# Patient Record
Sex: FEMALE | Race: WHITE | NOT HISPANIC OR LATINO | Employment: UNEMPLOYED | URBAN - METROPOLITAN AREA
[De-identification: names, ages, dates, MRNs, and addresses within clinical notes are randomized per-mention and may not be internally consistent; named-entity substitution may affect disease eponyms.]

---

## 2020-10-06 ENCOUNTER — PATIENT OUTREACH (OUTPATIENT)
Dept: ADMINISTRATIVE | Facility: HOSPITAL | Age: 35
End: 2020-10-06

## 2020-10-06 NOTE — LETTER
October 6, 2020    [place] [address]             [facility]  [address]  [facility] [address]  Phone: [phone] Dear Douglas Phelps    [facility] is committed to your overall health.  To help you get the most out of each of your visits, we will review your information to make sure you are up to date on all of your recommended tests and/or procedures.      As a new patient to [facility], we may not have your complete medical records. She has found that your chart shows you may be due for:    Health Maintenance Due   Topic Date Due    Hepatitis C Screening  1985    Lipid Panel  1985    HIV Screening  05/30/2000    TETANUS VACCINE  05/30/2003    Cervical Cancer Screening  05/30/2006    Influenza Vaccine (1) 08/01/2020     If you have had any of the above done at another facility, please let us know so that we may obtain copies from that facility.  If you have a copy of these records, please provide a copy for us to scan into your chart.    If you are currently taking medication, please bring it with you to your appointment for review.    Also, if you have any type of Advanced Directives, please bring them with you to your office visit so we may scan them into your chart.      Thank You,  Your [facility]  Katia Tomlinson L.P.N. Clinical Care Coordinator  [address] [place] [address], [place] [address] [phone]

## 2020-10-20 ENCOUNTER — OFFICE VISIT (OUTPATIENT)
Dept: FAMILY MEDICINE | Facility: CLINIC | Age: 35
End: 2020-10-20
Payer: MEDICAID

## 2020-10-20 VITALS
RESPIRATION RATE: 16 BRPM | OXYGEN SATURATION: 97 % | HEIGHT: 64 IN | DIASTOLIC BLOOD PRESSURE: 81 MMHG | BODY MASS INDEX: 34.02 KG/M2 | TEMPERATURE: 98 F | HEART RATE: 85 BPM | SYSTOLIC BLOOD PRESSURE: 130 MMHG | WEIGHT: 199.25 LBS

## 2020-10-20 DIAGNOSIS — Z76.89 ENCOUNTER TO ESTABLISH CARE WITH NEW DOCTOR: ICD-10-CM

## 2020-10-20 DIAGNOSIS — Z13.220 ENCOUNTER FOR LIPID SCREENING FOR CARDIOVASCULAR DISEASE: ICD-10-CM

## 2020-10-20 DIAGNOSIS — H61.21 IMPACTED CERUMEN OF RIGHT EAR: ICD-10-CM

## 2020-10-20 DIAGNOSIS — Z86.39 HISTORY OF VITAMIN D DEFICIENCY: ICD-10-CM

## 2020-10-20 DIAGNOSIS — L30.9 DERMATITIS: ICD-10-CM

## 2020-10-20 DIAGNOSIS — Z13.6 ENCOUNTER FOR LIPID SCREENING FOR CARDIOVASCULAR DISEASE: ICD-10-CM

## 2020-10-20 DIAGNOSIS — Z86.2 HISTORY OF PERNICIOUS ANEMIA: ICD-10-CM

## 2020-10-20 DIAGNOSIS — F33.2 SEVERE EPISODE OF RECURRENT MAJOR DEPRESSIVE DISORDER, WITHOUT PSYCHOTIC FEATURES: Primary | ICD-10-CM

## 2020-10-20 DIAGNOSIS — Z23 NEED FOR IMMUNIZATION AGAINST INFLUENZA: ICD-10-CM

## 2020-10-20 DIAGNOSIS — Z11.59 NEED FOR HEPATITIS C SCREENING TEST: ICD-10-CM

## 2020-10-20 DIAGNOSIS — J01.00 ACUTE NON-RECURRENT MAXILLARY SINUSITIS: ICD-10-CM

## 2020-10-20 DIAGNOSIS — Z11.4 SCREENING FOR HIV (HUMAN IMMUNODEFICIENCY VIRUS): ICD-10-CM

## 2020-10-20 PROCEDURE — 90471 IMMUNIZATION ADMIN: CPT | Mod: S$GLB,,, | Performed by: FAMILY MEDICINE

## 2020-10-20 PROCEDURE — 90686 IIV4 VACC NO PRSV 0.5 ML IM: CPT | Mod: S$GLB,,, | Performed by: FAMILY MEDICINE

## 2020-10-20 PROCEDURE — 99204 OFFICE O/P NEW MOD 45 MIN: CPT | Mod: 25,S$GLB,, | Performed by: FAMILY MEDICINE

## 2020-10-20 PROCEDURE — 99204 PR OFFICE/OUTPT VISIT, NEW, LEVL IV, 45-59 MIN: ICD-10-PCS | Mod: 25,S$GLB,, | Performed by: FAMILY MEDICINE

## 2020-10-20 PROCEDURE — 90686 FLU VACCINE (QUAD) GREATER THAN OR EQUAL TO 3YO PRESERVATIVE FREE IM: ICD-10-PCS | Mod: S$GLB,,, | Performed by: FAMILY MEDICINE

## 2020-10-20 PROCEDURE — 90471 FLU VACCINE (QUAD) GREATER THAN OR EQUAL TO 3YO PRESERVATIVE FREE IM: ICD-10-PCS | Mod: S$GLB,,, | Performed by: FAMILY MEDICINE

## 2020-10-20 RX ORDER — TRIAMCINOLONE ACETONIDE 1 MG/G
CREAM TOPICAL 2 TIMES DAILY
Qty: 28.4 G | Refills: 1 | Status: SHIPPED | OUTPATIENT
Start: 2020-10-20

## 2020-10-20 RX ORDER — CITALOPRAM 40 MG/1
40 TABLET, FILM COATED ORAL DAILY
COMMUNITY
End: 2020-10-20 | Stop reason: SDUPTHER

## 2020-10-20 RX ORDER — BUPROPION HYDROCHLORIDE 150 MG/1
150 TABLET ORAL DAILY
Qty: 90 TABLET | Refills: 1 | Status: SHIPPED | OUTPATIENT
Start: 2020-10-20 | End: 2020-12-01 | Stop reason: DRUGHIGH

## 2020-10-20 RX ORDER — CITALOPRAM 40 MG/1
40 TABLET, FILM COATED ORAL DAILY
Qty: 90 TABLET | Refills: 3 | Status: SHIPPED | OUTPATIENT
Start: 2020-10-20 | End: 2021-04-19 | Stop reason: SDUPTHER

## 2020-10-20 RX ORDER — LORATADINE AND PSEUDOEPHEDRINE SULFATE 5; 120 MG/1; MG/1
1 TABLET, EXTENDED RELEASE ORAL 2 TIMES DAILY
Qty: 20 TABLET | Refills: 0 | Status: SHIPPED | OUTPATIENT
Start: 2020-10-20 | End: 2020-10-30

## 2020-10-20 NOTE — PROGRESS NOTES
[facility] - Clinic Note    Subjective      Ms. Phelps is a 35 y.o. female who presents to clinic to establish care.   Patient moved from [place] 2 years ago.   Has been followed by Dr. Bender.     Has a history of depression.  States that she was diagnosed with depression as a teenager.  Was diagnosed with major depression in 2013.  She has tried many medications but Celexa seems to help her.  She is currently on Celexa 40 mg daily.  She does report that she has noticed an increase in her depression due to the COVID pandemic.  States that she is crying a lot more often just because of the unknown of what will happen.  Reports that celexa is helping her but would like to add wellbutrin for the time being. States that she has been on wellbutrin in the past and responded well.   Denies SI/HI.  PHQ-9 below  Little interest or pleasure in doing things: More than half the days  Feeling down, depressed, or hopeless: Nearly every day  Trouble falling or staying asleep, or sleeping too much: Nearly every day(sleeping too much )  Feeling tired or having little energy: More than half the days  Poor appetite or overeating: More than half the days  Feeling bad about yourself - or that you are a failure or have let yourself or your family down: More than half the days  Trouble concentrating on things, such as reading the newspaper or watching television: More than half the days  Moving or speaking so slowly that other people could have noticed. Or the opposite - being so fidgety or restless that you have been moving around a lot more than usual: Not at all  Thoughts that you would be better off dead, or of hurting yourself in some way: Not at all  PHQ-9 Total Score: 16    Complains of a rash for the past couple of weeks.   Has a history of psoriasis. Concerned for tinea.   States that the rash is located on the back of the right side of her scalp and in bilateral axilla.   Admits to pruritis.   Tried OTC monostat and athletes  "foot medication with no relief. Reports that it made it worse.   No recent change in anything.   No one else at home with the rash.    Reports a history of B12 deficiency and pernicious anemia as well as vitamin D deficiency. Takes OTC supplements intermittently.    Complains of sinus pressure.   Present for the past 2-3 days.   Recently went camping and worse after.   Has a history of allergies.   Admits to post-nasal drip, dry nares, and headaches.   Has not tried anything OTC.    PMH Douglas has a past medical history of Depression.   PSXH Douglas has a past surgical history that includes  section.   FH Douglas's family history includes Diabetes in her mother.   SH Douglas reports that she has never smoked. She has never used smokeless tobacco. She reports previous alcohol use. She reports previous drug use.   ALG Douglas has No Known Allergies.   MED Douglas has a current medication list which includes the following prescription(s): citalopram, bupropion, claritin-d 12 hour, and triamcinolone acetonide 0.1%.     Review of Systems   Constitutional: Negative for activity change, appetite change, chills, fatigue and fever.   HENT: Positive for congestion and postnasal drip. Negative for ear discharge, ear pain, rhinorrhea, sinus pressure, sinus pain and sore throat.    Eyes: Negative for discharge, itching and visual disturbance.   Respiratory: Negative for cough, chest tightness, shortness of breath and wheezing.    Cardiovascular: Negative for chest pain, palpitations and leg swelling.   Gastrointestinal: Negative for abdominal pain, nausea and vomiting.   Skin: Positive for rash. Negative for wound.   Neurological: Positive for headaches.   Psychiatric/Behavioral: Positive for decreased concentration. Negative for confusion, self-injury, sleep disturbance and suicidal ideas. The patient is not nervous/anxious.      Objective     /81   Pulse 85   Temp 98.1 °F (36.7 °C) (Temporal)   Resp 16   Ht 5' 4" " (1.626 m)   Wt 90.4 kg (199 lb 4 oz)   LMP 10/06/2020 (Exact Date)   SpO2 97%   BMI 34.20 kg/m²     Physical Exam   Constitutional: She appears well-developed, well-nourished and obese.  Non-toxic appearance. She does not appear ill. No distress.   HENT:   Head: Normocephalic and atraumatic.   Right Ear: Tympanic membrane, external ear and ear canal normal.   Left Ear: Tympanic membrane, external ear and ear canal normal.   Mouth/Throat: Mucous membranes are moist. No oropharyngeal exudate or posterior oropharyngeal erythema. Oropharynx is clear.   Dry nares bilaterally  +maxillary sinuses TTP   Eyes: Pupils are equal, round, and reactive to light. Conjunctivae are normal. Right eye exhibits no discharge. Left eye exhibits no discharge. No scleral icterus.   Neck: Neck supple.   Cardiovascular: Normal rate, regular rhythm, normal heart sounds and normal pulses. Exam reveals no gallop and no friction rub.   No murmur heard.  Pulmonary/Chest: Effort normal and breath sounds normal. No respiratory distress. She has no wheezes. She has no rhonchi. She has no rales.   Abdominal: Normal appearance.   Lymphadenopathy:     She has no cervical adenopathy.   Neurological: She is alert.   Skin: Skin is warm and dry. Capillary refill takes less than 2 seconds. She is not diaphoretic.   Dry flaky skin noted on the base of the head on the right as well as bilateral axilla   Psychiatric: Her behavior is normal. Mood, judgment and thought content normal.   Vitals reviewed.     Assessment/Plan     Douglas was seen today to establish care.    Diagnoses and all orders for this visit:  -New patient and new problem to me    Severe episode of recurrent major depressive disorder, without psychotic features  -     buPROPion (WELLBUTRIN XL) 150 MG TB24 tablet; Take 1 tablet (150 mg total) by mouth once daily.  -     citalopram (CELEXA) 40 MG tablet; Take 1 tablet (40 mg total) by mouth once daily.  - Unstable at this time. Add wellbutrin  and continue celexa and monitor for improvement    Dermatitis  -     Ambulatory referral/consult to Dermatology; Future  -     triamcinolone acetonide 0.1% (KENALOG) 0.1 % cream; Apply topically 2 (two) times daily.  - [facility] information given. Unsure the etiology of the rash.    Impacted cerumen of right ear  -irrigated in clinic    Acute non-recurrent maxillary sinusitis  -     loratadine-pseudoephedrine 5-120 mg (CLARITIN-D 12 HOUR) 5-120 mg per tablet; Take 1 tablet by mouth 2 (two) times daily. for 10 days  - OTC nasal saline drops BID-TID daily    History of pernicious anemia  -     Vitamin B12; Future    History of vitamin D deficiency  -     Vitamin D; Future    Encounter for lipid screening for cardiovascular disease  -     Lipid Panel; Future    Need for hepatitis C screening test  -     Hepatitis C Antibody; Future    Screening for HIV (human immunodeficiency virus)  -     HIV 1/2 Ag/Ab (4th Gen); Future    Need for immunization against influenza  -     Influenza - Quadrivalent *Preferred* (6 months+) (PF)    Encounter to establish care with new doctor      Follow up in about 6 weeks (around 12/1/2020).    Future Appointments   Date Time Provider Department Center   11/3/2020  8:30 AM [facility], LABORATORY [facility] LAB [facility]   12/1/2020 10:20 AM Martha Brady MD [facility]       Martha Brady MD  Family Medicine  [facility]

## 2020-11-03 ENCOUNTER — LAB VISIT (OUTPATIENT)
Dept: LAB | Facility: HOSPITAL | Age: 35
End: 2020-11-03
Attending: FAMILY MEDICINE
Payer: MEDICAID

## 2020-11-03 DIAGNOSIS — Z13.220 ENCOUNTER FOR LIPID SCREENING FOR CARDIOVASCULAR DISEASE: ICD-10-CM

## 2020-11-03 DIAGNOSIS — Z11.59 NEED FOR HEPATITIS C SCREENING TEST: ICD-10-CM

## 2020-11-03 DIAGNOSIS — Z86.39 HISTORY OF VITAMIN D DEFICIENCY: ICD-10-CM

## 2020-11-03 DIAGNOSIS — Z13.6 ENCOUNTER FOR LIPID SCREENING FOR CARDIOVASCULAR DISEASE: ICD-10-CM

## 2020-11-03 DIAGNOSIS — Z11.4 SCREENING FOR HIV (HUMAN IMMUNODEFICIENCY VIRUS): ICD-10-CM

## 2020-11-03 DIAGNOSIS — Z86.2 HISTORY OF PERNICIOUS ANEMIA: ICD-10-CM

## 2020-11-03 LAB
25(OH)D3+25(OH)D2 SERPL-MCNC: 14 NG/ML (ref 30–96)
CHOLEST SERPL-MCNC: 181 MG/DL (ref 120–199)
CHOLEST/HDLC SERPL: 3.5 {RATIO} (ref 2–5)
HDLC SERPL-MCNC: 51 MG/DL (ref 40–75)
HDLC SERPL: 28.2 % (ref 20–50)
LDLC SERPL CALC-MCNC: 119.8 MG/DL (ref 63–159)
NONHDLC SERPL-MCNC: 130 MG/DL
TRIGL SERPL-MCNC: 51 MG/DL (ref 30–150)
VIT B12 SERPL-MCNC: 628 PG/ML (ref 210–950)

## 2020-11-03 PROCEDURE — 80061 LIPID PANEL: CPT

## 2020-11-03 PROCEDURE — 86803 HEPATITIS C AB TEST: CPT

## 2020-11-03 PROCEDURE — 82607 VITAMIN B-12: CPT

## 2020-11-03 PROCEDURE — 36415 COLL VENOUS BLD VENIPUNCTURE: CPT

## 2020-11-03 PROCEDURE — 86703 HIV-1/HIV-2 1 RESULT ANTBDY: CPT

## 2020-11-03 PROCEDURE — 82306 VITAMIN D 25 HYDROXY: CPT

## 2020-11-04 LAB
HCV AB SERPL QL IA: NEGATIVE
HIV 1+2 AB+HIV1 P24 AG SERPL QL IA: NEGATIVE

## 2020-11-18 DIAGNOSIS — E55.9 VITAMIN D DEFICIENCY: Primary | ICD-10-CM

## 2020-11-18 RX ORDER — ERGOCALCIFEROL 1.25 MG/1
50000 CAPSULE ORAL
Qty: 4 CAPSULE | Refills: 1 | Status: SHIPPED | OUTPATIENT
Start: 2020-11-18 | End: 2020-12-29 | Stop reason: SDUPTHER

## 2020-12-01 ENCOUNTER — OFFICE VISIT (OUTPATIENT)
Dept: FAMILY MEDICINE | Facility: CLINIC | Age: 35
End: 2020-12-01
Payer: MEDICAID

## 2020-12-01 VITALS
SYSTOLIC BLOOD PRESSURE: 112 MMHG | HEIGHT: 64 IN | DIASTOLIC BLOOD PRESSURE: 80 MMHG | HEART RATE: 91 BPM | BODY MASS INDEX: 33.35 KG/M2 | RESPIRATION RATE: 15 BRPM | WEIGHT: 195.38 LBS | TEMPERATURE: 98 F | OXYGEN SATURATION: 97 %

## 2020-12-01 DIAGNOSIS — E55.9 VITAMIN D DEFICIENCY: ICD-10-CM

## 2020-12-01 DIAGNOSIS — F33.2 SEVERE EPISODE OF RECURRENT MAJOR DEPRESSIVE DISORDER, WITHOUT PSYCHOTIC FEATURES: Primary | ICD-10-CM

## 2020-12-01 PROCEDURE — 99214 OFFICE O/P EST MOD 30 MIN: CPT | Mod: S$GLB,,, | Performed by: FAMILY MEDICINE

## 2020-12-01 PROCEDURE — 99214 PR OFFICE/OUTPT VISIT, EST, LEVL IV, 30-39 MIN: ICD-10-PCS | Mod: S$GLB,,, | Performed by: FAMILY MEDICINE

## 2020-12-01 RX ORDER — BUPROPION HYDROCHLORIDE 300 MG/1
300 TABLET ORAL DAILY
Qty: 30 TABLET | Refills: 3 | Status: SHIPPED | OUTPATIENT
Start: 2020-12-01 | End: 2021-04-19 | Stop reason: SDUPTHER

## 2020-12-01 NOTE — PROGRESS NOTES
"[facility] - Clinic Note    Subjective      Ms. Phelps is a 35 y.o. female who presents to clinic for a follow up.     Patient seen about 6 weeks ago to establish care.     History of depression: currently active.   Takes celexa 40mg and wellbutrin 150mg was added at her last visit.   Reports that she has noticed an improvement in her mood as well as her activity.  She has lost 4 lb and also getting up in doing things and working out.  She feels that she would like to increase her Wellbutrin as she is not completely where she would like to be.  Denies SI/HI.    Vitamin D low on labs. Prescribed vitamin d supplementation was sent in.  Compliant with the medication.  Has about 2 weeks left.    PMH Douglas has a past medical history of Depression.   PSXH Douglas has a past surgical history that includes  section.    Douglas's family history includes Diabetes in her mother.   SH Douglas reports that she has never smoked. She has never used smokeless tobacco. She reports previous alcohol use. She reports previous drug use.   MICHELLE Douglas has No Known Allergies.   [facility] Douglas has a current medication list which includes the following prescription(s): citalopram, ergocalciferol, triamcinolone acetonide 0.1%, and bupropion.     Review of Systems   Constitutional: Negative for activity change, appetite change, chills, fatigue and fever.   Eyes: Negative for visual disturbance.   Respiratory: Negative for cough and shortness of breath.    Cardiovascular: Negative for chest pain, palpitations and leg swelling.   Gastrointestinal: Negative for abdominal pain.   Neurological: Negative for dizziness and headaches.   Psychiatric/Behavioral: Negative for confusion.     Objective     /80   Pulse 91   Temp 98.3 °F (36.8 °C) (Temporal)   Resp 15   Ht 5' 4" (1.626 m)   Wt 88.6 kg (195 lb 6.4 oz)   LMP 2020   SpO2 97%   BMI 33.54 kg/m²     Physical Exam   Constitutional: She appears well-developed, " well-nourished and obese. She is cooperative.  Non-toxic appearance. She does not appear ill. No distress.   HENT:   Head: Normocephalic and atraumatic.   Eyes: Right eye exhibits no discharge. Left eye exhibits no discharge.   Neck: Neck supple.   Cardiovascular: Normal rate, regular rhythm, normal heart sounds and normal pulses. Exam reveals no gallop and no friction rub.   No murmur heard.  Pulmonary/Chest: Effort normal and breath sounds normal. No respiratory distress. She has no wheezes. She has no rhonchi. She has no rales.   Abdominal: Normal appearance.   Lymphadenopathy:     She has no cervical adenopathy.   Neurological: She is alert.   Skin: Skin is warm and dry. Capillary refill takes less than 2 seconds. She is not diaphoretic.   Psychiatric: Her speech is normal and behavior is normal. Judgment and thought content normal. She exhibits a depressed mood.   Vitals reviewed.     Assessment/Plan     Douglas was seen today for follow-up.    Diagnoses and all orders for this visit:    Severe episode of recurrent major depressive disorder, without psychotic features  -     buPROPion (WELLBUTRIN XL) 300 MG 24 hr tablet; Take 1 tablet (300 mg total) by mouth once daily.  - Continue celexa and increase wellbutrin    Vitamin D deficiency  -     Vitamin D; Future  - Recheck vitamin d in 3 weeks and adjust treatment as indicated.    Pap at next appt.    Future Appointments   Date Time Provider Department Center   12/22/2020  3:00 PM [facility], LABORATORY [facility] LAB [facility]   1/12/2021 10:00 AM Martha Brady MD [facility]       Martha Brady MD  Family Medicine  [facility]

## 2020-12-22 ENCOUNTER — LAB VISIT (OUTPATIENT)
Dept: LAB | Facility: HOSPITAL | Age: 35
End: 2020-12-22
Attending: FAMILY MEDICINE
Payer: MEDICAID

## 2020-12-22 DIAGNOSIS — E55.9 VITAMIN D DEFICIENCY: ICD-10-CM

## 2020-12-22 PROCEDURE — 36415 COLL VENOUS BLD VENIPUNCTURE: CPT

## 2020-12-22 PROCEDURE — 82306 VITAMIN D 25 HYDROXY: CPT

## 2020-12-23 LAB — 25(OH)D3+25(OH)D2 SERPL-MCNC: 16 NG/ML (ref 30–96)

## 2020-12-29 DIAGNOSIS — E55.9 VITAMIN D DEFICIENCY: ICD-10-CM

## 2020-12-29 RX ORDER — ERGOCALCIFEROL 1.25 MG/1
50000 CAPSULE ORAL
Qty: 4 CAPSULE | Refills: 1 | Status: SHIPPED | OUTPATIENT
Start: 2020-12-29 | End: 2021-02-27

## 2020-12-29 NOTE — TELEPHONE ENCOUNTER
----- Message from Martha Brady MD sent at 12/23/2020  8:22 AM CST -----  Vitamin D continues to still be low. Will continue vitamin Dd supplementation for 3 months. Please see if she needs a refill?

## 2021-01-07 ENCOUNTER — TELEPHONE (OUTPATIENT)
Dept: FAMILY MEDICINE | Facility: CLINIC | Age: 36
End: 2021-01-07

## 2021-02-10 ENCOUNTER — PATIENT OUTREACH (OUTPATIENT)
Dept: ADMINISTRATIVE | Facility: HOSPITAL | Age: 36
End: 2021-02-10

## 2021-04-19 ENCOUNTER — OFFICE VISIT (OUTPATIENT)
Dept: FAMILY MEDICINE | Facility: CLINIC | Age: 36
End: 2021-04-19
Payer: MEDICAID

## 2021-04-19 ENCOUNTER — LAB VISIT (OUTPATIENT)
Dept: LAB | Facility: HOSPITAL | Age: 36
End: 2021-04-19
Attending: FAMILY MEDICINE
Payer: MEDICAID

## 2021-04-19 VITALS
BODY MASS INDEX: 30.96 KG/M2 | DIASTOLIC BLOOD PRESSURE: 84 MMHG | WEIGHT: 181.38 LBS | OXYGEN SATURATION: 98 % | SYSTOLIC BLOOD PRESSURE: 122 MMHG | RESPIRATION RATE: 18 BRPM | TEMPERATURE: 98 F | HEIGHT: 64 IN | HEART RATE: 107 BPM

## 2021-04-19 DIAGNOSIS — E55.9 VITAMIN D DEFICIENCY: ICD-10-CM

## 2021-04-19 DIAGNOSIS — F33.2 SEVERE EPISODE OF RECURRENT MAJOR DEPRESSIVE DISORDER, WITHOUT PSYCHOTIC FEATURES: Primary | ICD-10-CM

## 2021-04-19 LAB
ALBUMIN SERPL BCP-MCNC: 4.2 G/DL (ref 3.5–5.2)
ALP SERPL-CCNC: 64 U/L (ref 55–135)
ALT SERPL W/O P-5'-P-CCNC: 15 U/L (ref 10–44)
ANION GAP SERPL CALC-SCNC: 13 MMOL/L (ref 8–16)
AST SERPL-CCNC: 14 U/L (ref 10–40)
BILIRUB SERPL-MCNC: 0.5 MG/DL (ref 0.1–1)
BUN SERPL-MCNC: 11 MG/DL (ref 6–20)
CALCIUM SERPL-MCNC: 9.4 MG/DL (ref 8.7–10.5)
CHLORIDE SERPL-SCNC: 103 MMOL/L (ref 95–110)
CO2 SERPL-SCNC: 26 MMOL/L (ref 23–29)
CREAT SERPL-MCNC: 1 MG/DL (ref 0.5–1.4)
EST. GFR  (AFRICAN AMERICAN): >60 ML/MIN/1.73 M^2
EST. GFR  (NON AFRICAN AMERICAN): >60 ML/MIN/1.73 M^2
GLUCOSE SERPL-MCNC: 101 MG/DL (ref 70–110)
POTASSIUM SERPL-SCNC: 4.4 MMOL/L (ref 3.5–5.1)
PROT SERPL-MCNC: 7.2 G/DL (ref 6–8.4)
SODIUM SERPL-SCNC: 142 MMOL/L (ref 136–145)

## 2021-04-19 PROCEDURE — 99214 PR OFFICE/OUTPT VISIT, EST, LEVL IV, 30-39 MIN: ICD-10-PCS | Mod: S$GLB,,, | Performed by: FAMILY MEDICINE

## 2021-04-19 PROCEDURE — 80053 COMPREHEN METABOLIC PANEL: CPT | Performed by: FAMILY MEDICINE

## 2021-04-19 PROCEDURE — 99214 OFFICE O/P EST MOD 30 MIN: CPT | Mod: S$GLB,,, | Performed by: FAMILY MEDICINE

## 2021-04-19 PROCEDURE — 36415 COLL VENOUS BLD VENIPUNCTURE: CPT | Performed by: FAMILY MEDICINE

## 2021-04-19 PROCEDURE — 82306 VITAMIN D 25 HYDROXY: CPT | Performed by: FAMILY MEDICINE

## 2021-04-19 RX ORDER — CITALOPRAM 40 MG/1
40 TABLET, FILM COATED ORAL DAILY
Qty: 90 TABLET | Refills: 3 | Status: SHIPPED | OUTPATIENT
Start: 2021-04-19 | End: 2021-07-18

## 2021-04-19 RX ORDER — BUPROPION HYDROCHLORIDE 150 MG/1
150 TABLET ORAL DAILY
Qty: 30 TABLET | Refills: 5 | Status: SHIPPED | OUTPATIENT
Start: 2021-04-19 | End: 2022-04-19

## 2021-04-19 RX ORDER — BUPROPION HYDROCHLORIDE 300 MG/1
300 TABLET ORAL DAILY
Qty: 30 TABLET | Refills: 5 | Status: SHIPPED | OUTPATIENT
Start: 2021-04-19 | End: 2022-04-19

## 2021-04-20 ENCOUNTER — TELEPHONE (OUTPATIENT)
Dept: HEMATOLOGY/ONCOLOGY | Facility: CLINIC | Age: 36
End: 2021-04-20

## 2021-04-20 LAB — 25(OH)D3+25(OH)D2 SERPL-MCNC: 24 NG/ML (ref 30–96)

## 2021-05-13 ENCOUNTER — TELEPHONE (OUTPATIENT)
Dept: FAMILY MEDICINE | Facility: CLINIC | Age: 36
End: 2021-05-13

## 2021-05-14 ENCOUNTER — OFFICE VISIT (OUTPATIENT)
Dept: FAMILY MEDICINE | Facility: CLINIC | Age: 36
End: 2021-05-14
Payer: MEDICAID

## 2021-05-14 VITALS
TEMPERATURE: 98 F | WEIGHT: 181.38 LBS | HEART RATE: 113 BPM | SYSTOLIC BLOOD PRESSURE: 111 MMHG | OXYGEN SATURATION: 96 % | HEIGHT: 64 IN | DIASTOLIC BLOOD PRESSURE: 79 MMHG | BODY MASS INDEX: 30.96 KG/M2 | RESPIRATION RATE: 17 BRPM

## 2021-05-14 DIAGNOSIS — B82.0 ROUNDWORM INFECTION: Primary | ICD-10-CM

## 2021-05-14 DIAGNOSIS — R10.84 GENERALIZED ABDOMINAL PAIN: ICD-10-CM

## 2021-05-14 PROCEDURE — 99214 OFFICE O/P EST MOD 30 MIN: CPT | Mod: S$GLB,,, | Performed by: FAMILY MEDICINE

## 2021-05-14 PROCEDURE — 99214 PR OFFICE/OUTPT VISIT, EST, LEVL IV, 30-39 MIN: ICD-10-PCS | Mod: S$GLB,,, | Performed by: FAMILY MEDICINE

## 2021-05-14 RX ORDER — ALBENDAZOLE 200 MG/1
400 TABLET, FILM COATED ORAL ONCE
Qty: 2 TABLET | Refills: 0 | Status: SHIPPED | OUTPATIENT
Start: 2021-05-14 | End: 2021-05-14

## 2021-05-25 ENCOUNTER — OFFICE VISIT (OUTPATIENT)
Dept: FAMILY MEDICINE | Facility: CLINIC | Age: 36
End: 2021-05-25
Payer: MEDICAID

## 2021-05-25 VITALS
OXYGEN SATURATION: 97 % | SYSTOLIC BLOOD PRESSURE: 118 MMHG | BODY MASS INDEX: 30.39 KG/M2 | DIASTOLIC BLOOD PRESSURE: 66 MMHG | HEART RATE: 102 BPM | HEIGHT: 64 IN | RESPIRATION RATE: 17 BRPM | TEMPERATURE: 97 F | WEIGHT: 178 LBS

## 2021-05-25 DIAGNOSIS — F33.2 SEVERE EPISODE OF RECURRENT MAJOR DEPRESSIVE DISORDER, WITHOUT PSYCHOTIC FEATURES: ICD-10-CM

## 2021-05-25 DIAGNOSIS — Z12.4 ENCOUNTER FOR PAPANICOLAOU SMEAR FOR CERVICAL CANCER SCREENING: Primary | ICD-10-CM

## 2021-05-25 PROCEDURE — 99214 PR OFFICE/OUTPT VISIT, EST, LEVL IV, 30-39 MIN: ICD-10-PCS | Mod: S$GLB,,, | Performed by: FAMILY MEDICINE

## 2021-05-25 PROCEDURE — 88175 CYTOPATH C/V AUTO FLUID REDO: CPT | Performed by: FAMILY MEDICINE

## 2021-05-25 PROCEDURE — 99214 OFFICE O/P EST MOD 30 MIN: CPT | Mod: S$GLB,,, | Performed by: FAMILY MEDICINE

## 2021-05-25 PROCEDURE — 87624 HPV HI-RISK TYP POOLED RSLT: CPT | Performed by: FAMILY MEDICINE

## 2021-05-28 LAB
FINAL PATHOLOGIC DIAGNOSIS: NORMAL
Lab: NORMAL

## 2021-06-04 LAB
HPV HR 12 DNA SPEC QL NAA+PROBE: NEGATIVE
HPV16 AG SPEC QL: NEGATIVE
HPV18 DNA SPEC QL NAA+PROBE: NEGATIVE

## 2021-08-25 ENCOUNTER — TELEPHONE (OUTPATIENT)
Dept: FAMILY MEDICINE | Facility: CLINIC | Age: 36
End: 2021-08-25